# Patient Record
Sex: MALE | Race: OTHER | Employment: UNEMPLOYED | URBAN - METROPOLITAN AREA
[De-identification: names, ages, dates, MRNs, and addresses within clinical notes are randomized per-mention and may not be internally consistent; named-entity substitution may affect disease eponyms.]

---

## 2024-01-26 ENCOUNTER — APPOINTMENT (OUTPATIENT)
Dept: GENERAL RADIOLOGY | Facility: HOSPITAL | Age: 24
End: 2024-01-26
Payer: COMMERCIAL

## 2024-01-26 ENCOUNTER — APPOINTMENT (OUTPATIENT)
Dept: GENERAL RADIOLOGY | Facility: HOSPITAL | Age: 24
End: 2024-01-26
Attending: STUDENT IN AN ORGANIZED HEALTH CARE EDUCATION/TRAINING PROGRAM
Payer: COMMERCIAL

## 2024-01-26 ENCOUNTER — HOSPITAL ENCOUNTER (EMERGENCY)
Facility: HOSPITAL | Age: 24
Discharge: HOME | End: 2024-01-26
Attending: STUDENT IN AN ORGANIZED HEALTH CARE EDUCATION/TRAINING PROGRAM
Payer: COMMERCIAL

## 2024-01-26 VITALS
DIASTOLIC BLOOD PRESSURE: 59 MMHG | WEIGHT: 190 LBS | HEIGHT: 70 IN | TEMPERATURE: 98 F | RESPIRATION RATE: 25 BRPM | SYSTOLIC BLOOD PRESSURE: 116 MMHG | BODY MASS INDEX: 27.2 KG/M2 | OXYGEN SATURATION: 99 % | HEART RATE: 90 BPM

## 2024-01-26 DIAGNOSIS — S43.005A SHOULDER DISLOCATION, LEFT, INITIAL ENCOUNTER: Primary | ICD-10-CM

## 2024-01-26 PROCEDURE — 23650 CLTX SHO DSLC W/MNPJ WO ANES: CPT

## 2024-01-26 PROCEDURE — 73030 X-RAY EXAM OF SHOULDER: CPT

## 2024-01-26 PROCEDURE — 96374 THER/PROPH/DIAG INJ IV PUSH: CPT

## 2024-01-26 PROCEDURE — 73030 X-RAY EXAM OF SHOULDER: CPT | Performed by: STUDENT IN AN ORGANIZED HEALTH CARE EDUCATION/TRAINING PROGRAM

## 2024-01-26 PROCEDURE — 99285 EMERGENCY DEPT VISIT HI MDM: CPT

## 2024-01-26 RX ORDER — MIDAZOLAM HYDROCHLORIDE 1 MG/ML
2 INJECTION INTRAMUSCULAR; INTRAVENOUS ONCE
Status: DISCONTINUED | OUTPATIENT
Start: 2024-01-26 | End: 2024-01-26

## 2024-01-26 RX ORDER — KETAMINE HYDROCHLORIDE 50 MG/ML
INJECTION, SOLUTION INTRAMUSCULAR; INTRAVENOUS
Status: DISCONTINUED
Start: 2024-01-26 | End: 2024-01-27

## 2024-01-26 RX ORDER — KETAMINE HYDROCHLORIDE 50 MG/ML
1 INJECTION, SOLUTION INTRAMUSCULAR; INTRAVENOUS ONCE
Status: COMPLETED | OUTPATIENT
Start: 2024-01-26 | End: 2024-01-26

## 2024-01-27 NOTE — ED INITIAL ASSESSMENT (HPI)
Popped out right shoulder while playing basketball, states hx of right shoulder dislocations. States in the past he has been able to pop his shoulder back in but was unable to this time.

## 2024-01-27 NOTE — ED PROVIDER NOTES
Patient Seen in: [facility] Emergency Department      History     Chief Complaint   Patient presents with    Arm or Hand Injury     Stated Complaint: popped right shoulder out playing basketball, reports he has popped it out and *    Subjective:   HPI    Patient is a 24-year-old male presenting to emergency department with right shoulder dislocation.  He states that he has had problems with his right shoulder with it having popped out multiple times in the past.  He had played basketball quite competitively and in the past had been able to self reduce.  Tonight he was playing basketball as he is now playing recreationally and dislocation of heard with which he was not able to self reduce it prompting the visit to the ER.  He did report isolated right shoulder pain.  No weakness, no loss of sensation to the arm.    Objective:   History reviewed. No pertinent past medical history.           History reviewed. No pertinent surgical history.             Social History     Socioeconomic History    Marital status: Single   Tobacco Use    Smoking status: Never    Smokeless tobacco: Never   Vaping Use    Vaping Use: Never used   Substance and Sexual Activity    Alcohol use: Yes    Drug use: Never              Review of Systems    Positive for stated complaint: popped right shoulder out playing basketball, reports he has popped it out and *  Other systems are as noted in HPI.  Constitutional and vital signs reviewed.      All other systems reviewed and negative except as noted above.    Physical Exam     ED Triage Vitals   BP 01/26/24 1911 124/79   Pulse 01/26/24 1911 101   Resp 01/26/24 1911 20   Temp 01/26/24 1911 97.8 °F (36.6 °C)   Temp src 01/26/24 1911 Temporal   SpO2 01/26/24 1911 95 %   O2 Device 01/26/24 2030 None (Room air)       Current:/57   Pulse 90   Temp 97.8 °F (36.6 °C) (Temporal)   Resp 25   Ht 177.8 cm (5' 10\")   Wt 86.2 kg   SpO2 99%   BMI 27.26 kg/m²         Physical  Exam    Constitutional: No apparent distress  Eyes: No scleral icterus  Heart: regular rate rhythm, no murmurs  Lungs: Clear to auscultation bilaterally  Extremities: Right shoulder deformity appreciated concerning for dislocation.  Pain with any attempted movement noted by the patient.  Distal neurovascularly intact.  Neuro: Alert and oriented ×3          ED Course/ My interpretations:   Labs Reviewed - No data to display      My independent imaging interpretation:      Procedures    XR SHOULDER, COMPLETE (MIN 2 VIEWS), RIGHT (CPT=73030)    Anterior shoulder dislocation noted    XR SHOULDER, COMPLETE (MIN 2 VIEWS), RIGHT (CPT=73030)        All available radiology reports for this visit reviewed.      Medications given:  Orders Placed This Encounter    ketamine (Ketalar) 50 MG/ML injection 86 mg    DISCONTD: midazolam (Versed) 2 MG/2ML injection 2 mg    ketamine (Ketalar) 50 MG/ML injection                      MDM      Extensive differential was considered for the patient including shoulder dislocation, fracture, ligamentous injury, and other orthopedic and other pathology.    X-rays confirmed dislocation.    Procedure note:    Conscious sedation    The patient required sedation for closed reduction of right shoulder dislocation.  The risks, benefits, and alternatives were discussed with the patient and/or the family who consented.  The patient had a screening history and exam completed and there are no contraindications to sedation.  The patient has been NPO for 3 hours. ASA designation is ASA 1 - Normal health patient.  Mallampati score: I (soft palate, uvula, fauces, and tonsillar pillars visible).  The patient was placed on monitors and was under constant nursing observation.  Under my direct supervision the patient was given 86 mg of ketamine.  An appropriate level of sedation was achieved.  The patient remained hemodynamically stable with normal oxygen saturations during the procedure.  There were no  complications related to the sedation.  Patient was observed until mental status returned to baseline.  Patient was subsequently deemed appropriate for discharge home with responsible caretaker.  The sedation lasted 20 minutes during which I was present.    Closed reduction of right shoulder dislocation  Shoulder was reduced via traction countertraction technique.  Shoulder deformity resolved.    Postreduction x-rays were obtained.  Results discussed over the phone with Dr. Sunshine, orthopedics.  Given the fact the patient has resolution of his pain and has appropriate range of motion at this time he feels the patient is likely adequately reduced and advised that he should follow-up in the clinic for further evaluation.  Patient was advised of his x-ray results in detail including possible chip fracture. He advised on the importance of follow-up and reasons to seek emergency medical care if he were to worsen.  He demonstrated understanding, he is comfortable with the plan and follow-up as directed.             Disposition and Plan     Clinical Impression:  1. Shoulder dislocation, left, initial encounter         Disposition:  Discharge  1/26/2024 10:43 pm    Follow-up:  Dipesh Sunshine MD  [address] [place] [id number]  [phone]    Follow up            Medications Prescribed:  There are no discharge medications for this patient.                             Documentation created with the aid of Dragon voice recognition software.  Although efforts were made to ensure the accuracy of the note, some inaccuracies may persist.

## 2024-01-29 ENCOUNTER — TELEPHONE (OUTPATIENT)
Dept: ORTHOPEDICS CLINIC | Facility: CLINIC | Age: 24
End: 2024-01-29

## 2024-01-29 NOTE — TELEPHONE ENCOUNTER
Patient scheduled with Ana for right shoulder. Imaging in epic.    Future Appointments   Date Time Provider Department Center   1/30/2024  9:00 AM [id number]

## 2024-01-29 NOTE — TELEPHONE ENCOUNTER
New imaging?  Please advise.  Thank you!    DOI 1/26/24 shoulder dislocation w/ reduction    XR 1/26/24  Impression   CONCLUSION:  There is now posterior subluxation of the right glenohumeral joint with the humeral head appearing partially perched along the posterior glenoid.  Clinical correlation is recommended.  There may be a small chip fracture along the posterior  glenoid

## 2024-01-30 ENCOUNTER — HOSPITAL ENCOUNTER (OUTPATIENT)
Dept: MRI IMAGING | Facility: HOSPITAL | Age: 24
Discharge: HOME | End: 2024-01-30
Attending: PHYSICIAN ASSISTANT
Payer: COMMERCIAL

## 2024-01-30 ENCOUNTER — OFFICE VISIT (OUTPATIENT)
Dept: ORTHOPEDICS CLINIC | Facility: CLINIC | Age: 24
End: 2024-01-30
Payer: COMMERCIAL

## 2024-01-30 VITALS — HEIGHT: 70 IN | WEIGHT: 190 LBS | BODY MASS INDEX: 27.2 KG/M2

## 2024-01-30 DIAGNOSIS — S42.91XA TRAUMATIC CLOSED DISPLACED FRACTURE OF RIGHT SHOULDER WITH ANTERIOR DISLOCATION, INITIAL ENCOUNTER: ICD-10-CM

## 2024-01-30 DIAGNOSIS — M25.311 SHOULDER INSTABILITY, RIGHT: ICD-10-CM

## 2024-01-30 DIAGNOSIS — M25.311 SHOULDER INSTABILITY, RIGHT: Primary | ICD-10-CM

## 2024-01-30 PROCEDURE — 3008F BODY MASS INDEX DOCD: CPT | Performed by: PHYSICIAN ASSISTANT

## 2024-01-30 PROCEDURE — 73221 MRI JOINT UPR EXTREM W/O DYE: CPT | Performed by: PHYSICIAN ASSISTANT

## 2024-01-30 PROCEDURE — 99204 OFFICE O/P NEW MOD 45 MIN: CPT | Performed by: PHYSICIAN ASSISTANT

## 2024-01-30 NOTE — H&P
[facility] - ORTHOPEDICS  [address] [place] [id number]  [phone]     NEW PATIENT VISIT - HISTORY AND PHYSICAL EXAMINATION     Name: Donald Scott   MRN: [id number]  Date: 1/30/2024     CC: Right shoulder pain.    REFERRED BY: Fidencio Adams MD    HPI:   Donald Scott is a very pleasant 24 year old right-hand dominant male who presents today for evaluation, consultation, and management of right shoulder injury that occurred in January 20, 1723 after he was playing basketball and his shoulder popped out of place.  He reports that his shoulder is dislocated multiple times (unclear if subluxations) in the past and has always been able to self reduced.  However the most recent dislocation January 27, 2023 he was unable to reduce the shoulder.  He went to the emergency department at [facility] in which an x-ray confirmed a dislocation and the patient was recommended to undergo closed reduction of the right shoulder with traction that was successful.  He complains of weakness and rates his pain to be 1 out of 10.  He is a full-time student.    PMH:   History reviewed. No pertinent past medical history.    PAST SURGICAL HX:  History reviewed. No pertinent surgical history.    FAMILY HX:  History reviewed. No pertinent family history.    ALLERGIES:  Patient has no known allergies.    MEDICATIONS:   No current outpatient medications on file.       ROS: A comprehensive 14 point review of systems was performed and was negative aside from the aforementioned per history of present illness.    SOCIAL HX:  Social History     Occupational History    Not on file   Tobacco Use    Smoking status: Never    Smokeless tobacco: Never   Vaping Use    Vaping Use: Never used   Substance and Sexual Activity    Alcohol use: Yes    Drug use: Never    Sexual activity: Not on file        PE:   Vitals:    01/30/24 0915   Weight: 190 lb (86.2 kg)   Height: 5' 10\" (1.778 m)     Estimated body mass  index is 27.26 kg/m² as calculated from the following:    Height as of this encounter: 5' 10\" (1.778 m).    Weight as of this encounter: 190 lb (86.2 kg).    Physical Exam  Constitutional:       Appearance: Normal appearance.   HENT:      Head: Normocephalic and atraumatic.   Eyes:      Extraocular Movements: Extraocular movements intact.   Neck:      Musculoskeletal: Normal range of motion and neck supple.   Cardiovascular:      Pulses: Normal pulses.   Pulmonary:      Effort: Pulmonary effort is normal. No respiratory distress.   Abdominal:      General: There is no distension.   Skin:     General: Skin is warm.      Capillary Refill: Capillary refill takes less than 2 seconds.      Findings: No bruising.   Neurological:      General: No focal deficit present.      Mental Status: Alert.   Psychiatric:         Mood and Affect: Mood normal.     Examination of the right shoulder demonstrates:     Skin is intact, warm and dry.   Cervical:  Full ROM  Spurling's  Negative    Deformity:   none  Atrophy:   none    Scapular winging: Negative    Palpation:     AC Joint  Negative  Biceps Tendon  Positive  Greater Tuberosity Positive    ROM:   Forward Flexion:  120°  Abduction:   full and symmetric  External Rotation:  30°  Internal Rotation:  full and symmetric    Rotator Cuff Strength:   Supraspinatus:   5/5  Subscapularis:   5/5  Infraspinatus/Teres: 5/5    Provocative Tests:   [place]:   Positive  Speed's:   Positive  [place]'s:   Positive  Lift-off:   Negative  Apprehension:  Positive  Sulcus Sign:   Negative    Neurovascular Upper Extremity (Bilateral)  Motor:    5/5 EPL, Finger Abduction, , Pinch, Deltoid  Sensation:   intact to light touch median, ulnar, radial and axillary nerve  Circulation:   Normal, 2+ radial pulse    The contralateral upper extremity is without limitation in range of motion or strength, no positive provocative maneuvers.     Radiographic Examination/Diagnostics:    I personally viewed,  independently interpreted and radiology report was reviewed.      XR SHOULDER, COMPLETE (MIN 2 VIEWS), RIGHT (CPT=73030)    Result Date: 1/26/2024  PROCEDURE:  XR SHOULDER, COMPLETE (MIN 2 VIEWS), RIGHT (CPT=73030)  TECHNIQUE:  Multiple views were obtained.  COMPARISON:  [facility] , XR, XR SHOULDER, COMPLETE (MIN 2 VIEWS), RIGHT (CPT=73030), 1/26/2024, 7:50 PM.  INDICATIONS:  popped right shoulder out playing basketball, reports he has popped it out and back in before but couldn't pop it back in today.  Shoulder pain, dislocation  PATIENT STATED HISTORY: (As transcribed by Technologist)  popped right shoulder out playing basketball, reports he has popped it out and back in before but couldn't pop it back in today.    FINDINGS:  There is now posterior subluxation of the right glenohumeral joint with the humeral head appearing partially perched along the posterior glenoid.  Clinical correlation is recommended.  There may be a small chip fracture along the posterior glenoid.  Normal mineralization.            CONCLUSION:  There is now posterior subluxation of the right glenohumeral joint with the humeral head appearing partially perched along the posterior glenoid.  Clinical correlation is recommended.  There may be a small chip fracture along the posterior glenoid.    LOCATION:  [facility]   Dictated by (CST): Gomez Medina MD on 1/26/2024 at 9:01 PM     Finalized by (CST): Gomez Medina MD on 1/26/2024 at 9:02 PM       XR SHOULDER, COMPLETE (MIN 2 VIEWS), RIGHT (CPT=73030)    Result Date: 1/26/2024  PROCEDURE:  XR SHOULDER, COMPLETE (MIN 2 VIEWS), RIGHT (CPT=73030)  TECHNIQUE:  Multiple views were obtained.  COMPARISON:  None.  INDICATIONS:  popped right shoulder out playing basketball, reports he has popped it out and back in before but couldn't pop it back in today.  Dislocated shoulder with shoulder pain  PATIENT STATED HISTORY: (As transcribed by Technologist)  Patient states his right shoulder popped out of place  while playing basketball. Patient states he has a history of right shoulder dislocation but usually able to place back.    FINDINGS:  Anterior dislocation of the right glenohumeral joint noted.  No displaced fracture identified on the limited views.  Normal mineralization.  Unremarkable soft tissues.            CONCLUSION:  Anterior dislocation of the right glenohumeral joint.   LOCATION:  [facility]   Dictated by (CST): Gomez Medina MD on 1/26/2024 at 8:05 PM     Finalized by (CST): Gomez Medina MD on 1/26/2024 at 8:05 PM         IMPRESSION: Donald Scott is a 24 year old Right hand dominant male with right shoulder instability.     PLAN:   We had a detailed discussion outlining the etiology, anatomy, pathophysiology, and natural history of the patient's findings. Imaging was reviewed in detail and correlated to a 3-dimensional model of the patient's pathology.     We reviewed the treatment of this disease condition. In light of the acute traumatic incident, loss of normal function, and  failure to progress conservatively we recommend an MRI to evaluate the integrity of the patient's right shoulder labral complex. The patient will follow up after imaging.   Differential diagnosis includes but not limited to: rotator cuff/labral pathology, impingement, tendinopathy, cartilage injury/loose body, bone marrow edema, and osteoarthritis.     External records were also reviewed for pertinent historical findings contributing to the patients undiagnosed new problem with uncertain prognosis.     The patient had the opportunity to ask questions, and all questions were answered appropriately.         FOLLOW-UP:   Return to clinic following completion of MRI to review scan and findings.           JUNIOR Sky, PA-C Orthopedic Surgery / Sports Medicine Specialist  [facility]  [address] [place] [id number]   [email or web address]  [email or web address]  t: [phone]  o: [phone]  f:  [phone]    This note was dictated using Dragon software.  While it was briefly proofread prior to completion, some grammatical, spelling, and word choice errors due to dictation may still occur.

## 2024-02-02 ENCOUNTER — OFFICE VISIT (OUTPATIENT)
Dept: ORTHOPEDICS CLINIC | Facility: CLINIC | Age: 24
End: 2024-02-02
Payer: COMMERCIAL

## 2024-02-02 DIAGNOSIS — M21.821 HILL SACHS DEFORMITY, RIGHT: ICD-10-CM

## 2024-02-02 DIAGNOSIS — S43.431A TEAR OF RIGHT GLENOID LABRUM, INITIAL ENCOUNTER: ICD-10-CM

## 2024-02-02 DIAGNOSIS — S43.491A: Primary | ICD-10-CM

## 2024-02-02 PROCEDURE — 99215 OFFICE O/P EST HI 40 MIN: CPT | Performed by: ORTHOPAEDIC SURGERY

## 2024-02-02 NOTE — H&P
Orthopaedic Surgery  [address] [place] [id number]  [phone]     PRE SURGICAL - HISTORY AND PHYSICAL EXAMINATION     Name: Donald Scott   MRN: [id number]  Date: 2/2/2024     CC: Right shoulder pain and instability in the setting of glenoid labrum pathology.     HPI:   Donald Scott is a very pleasant 24 year old right-hand dominant male who presents today for evaluation of MRI scan of the shoulder and discussion regarding definitive management plan.     To summarize: right shoulder injury that occurred in January 20, 1723 after he was playing basketball and his shoulder popped out of place.  He reports that his shoulder is dislocated multiple times (unclear if subluxations) in the past and has always been able to self reduced.  However the most recent dislocation January 27, 2023 he was unable to reduce the shoulder.  He went to the emergency department at [facility] in which an x-ray confirmed a dislocation and the patient was recommended to undergo closed reduction of the right shoulder with traction that was successful.  He complains of weakness and rates his pain to be 1 out of 10.  He is a full-time student.     At the patient's last visit, evaluation was performed by my colleague Sincer LISANDRA Ching who recommended an MRI. The patient was then referred to me for consultation, and presents today for further discussion.       PMH:   No past medical history on file.    PAST SURGICAL HX:  No past surgical history on file.    FAMILY HX:  No family history on file.    ALLERGIES:  Patient has no known allergies.    MEDICATIONS:   No current outpatient medications on file.       ROS: A comprehensive 14 point review of systems was performed and was negative aside from the aforementioned per history of present illness.    SOCIAL HX:  Social History     Tobacco Use    Smoking status: Never    Smokeless tobacco: Never   Substance Use Topics    Alcohol use: Yes       PE:   There were no vitals filed for  this visit.  Estimated body mass index is 27.26 kg/m² as calculated from the following:    Height as of 1/30/24: 5' 10\" (1.778 m).    Weight as of 1/30/24: 190 lb (86.2 kg).    Physical Exam  Constitutional:       Appearance: Normal appearance.   HENT:      Head: Normocephalic and atraumatic.   Eyes:      Extraocular Movements: Extraocular movements intact.   Neck:      Musculoskeletal: Normal range of motion and neck supple.   Cardiovascular:      Pulses: Normal pulses.   Pulmonary:      Effort: Pulmonary effort is normal. No respiratory distress.   Abdominal:      General: There is no distension.   Skin:     General: Skin is warm.      Capillary Refill: Capillary refill takes less than 2 seconds.      Findings: No bruising.   Neurological:      General: No focal deficit present.      Mental Status: Alert.   Psychiatric:         Mood and Affect: Mood normal.     Examination of the right shoulder demonstrates:     Skin is intact, warm and dry.   Cervical:  Full ROM  Spurling's  Negative    Deformity:   none  Atrophy:   none    Scapular winging: Negative    Palpation:     AC Joint  Negative  Biceps Tendon  Negative  Greater Tuberosity Negative    ROM:   Forward Flexion:  full and symmetric  Abduction:   full and symmetric  External Rotation:  full and symmetric  Internal Rotation:  full and symmetric    Rotator Cuff Strength:   Supraspinatus:   5/5  Subscapularis:   5/5  Infraspinatus/Teres: 5/5    Provocative Tests:   Pacheco:   Negative  Speed's:   Negative  Scurry's:   Negative  Lift-off:    Negative  Apprehension:  Positive  Sulcus Sign:   Negative    Neurovascular Upper Extremity (Bilateral)  Motor:    5/5 EPL, Finger Abduction, , Pinch, Deltoid  Sensation:   intact to light touch median, ulnar, radial and axillary nerve  Circulation:   Normal, 2+ radial pulse    The contralateral upper extremity is without limitation in range of motion or strength, no positive provocative maneuvers.     Radiographic  Examination/Diagnostics:    XR and MRI of the shoulder personally viewed, independently interpreted and radiology report was reviewed.    MRI SHOULDER, RIGHT (CPT=73221)    Result Date: 1/30/2024  PROCEDURE:  MRI SHOULDER, RIGHT (CPT=73221)  COMPARISON:  [facility] , XR, XR SHOULDER, COMPLETE (MIN 2 VIEWS), RIGHT (CPT=73030), 1/26/2024, 7:50 PM.  [facility], XR, XR SHOULDER, COMPLETE (MIN 2 VIEWS), RIGHT (CPT=73030), 1/26/2024, 8:57 PM.  INDICATIONS:  Follow-up for recent traumatic right shoulder anterior glenohumeral dislocation.  Patient has shoulder pain and limited range of motion.  TECHNIQUE:  Multiplanar imaging of the shoulder including oblique coronal, axial and sagittal imaging was acquired including proton density fat suppression technique. Images were performed without intravenous gadolinium.  PATIENT STATED HISTORY: (As transcribed by Technologist)  Patient dislocated right shoulder Friday evening playing basketball.  Pain and LROM.    FINDINGS:  ROTATOR CUFF:  The rotator cuff is intact with a mild degree of tendinosis along the distal insertional fibers of supraspinatus and infraspinatus. MUSCULATURE:  No strain, edema, or atrophy.  AC JOINT/ACROMION:  No significant arthritis or acute injury.  Normal marrow and cortical signal. Type II acromion.  Normal subacromial space without evidence of subacromial impingement.  No evidence of subacromial/subdeltoid bursal fluid. BICEPS/LABRAL COMPLEX:  The long head of the biceps tendon is intact and within normal limits.  There is a slap lesion of the biceps labral anchor that extends into the posterior superior quadrant, the anterior superior and anterior inferior quadrant.  There is evidence of an associated anterior inferior quadrant labral-ligamentous periosteal sleeve avulsion (ALPSA lesion).  The posterior band of the IGHL appears attenuated at the humeral insertion, suspicious for sprain without makenzie tear or high-grade injury.  The middle glenohumeral ligament  is unremarkable. GLENOHUMERAL JOINT:  There is a moderate glenohumeral joint effusion.  There is a Hill-Sachs lesion of the posterior superior aspect of the humeral head with a slight degree of flattening and bone marrow edema.  There is also evidence of multiple subcortical cystic changes, suggesting that this Hill-Sachs lesion is likely acute on chronic.  Please correlate with patient's history for previous glenohumeral dislocations.  No osseous Bankart lesion is noted.            CONCLUSION:  1. Findings are suggestive of an acute on chronic glenohumeral dislocation with flattening and irregularity along the posterior superior humeral head with a slight degree of subcortical edema and subcortical cystic changes. 2. There is evidence of an ALPSA lesion along the anterior inferior quadrant.  No osseous Bankart lesion. 3. There is an associated slap lesion that extends from the biceps labral anchor into the posterior superior quadrant, the anterior superior quadrant and anterior inferior quadrant. 4. There is the suggestion of some attenuation of the humeral insertion of the posterior band of the IGHL, concerning for a mild sprain without discrete evidence of a high-grade injury or disruption. 5. Moderate glenohumeral joint effusion.   LOCATION:  [facility]   Dictated by (CST): Dav Del Real DO on 1/30/2024 at 3:10 PM     Finalized by ([facility]ST): Dav Del Real DO on 1/30/2024 at 3:18 PM       XR SHOULDER, COMPLETE (MIN 2 VIEWS), RIGHT (CPT=73030)    Result Date: 1/26/2024  PROCEDURE:  XR SHOULDER, COMPLETE (MIN 2 VIEWS), RIGHT (CPT=73030)  TECHNIQUE:  Multiple views were obtained.  COMPARISON:  [facility], XR SHOULDER, COMPLETE (MIN 2 VIEWS), RIGHT (CPT=73030), 1/26/2024, 7:50 PM.  INDICATIONS:  popped right shoulder out playing basketball, reports he has popped it out and back in before but couldn't pop it back in today.  Shoulder pain, dislocation  PATIENT STATED HISTORY: (As transcribed by Technologist)  popped right  shoulder out playing basketball, reports he has popped it out and back in before but couldn't pop it back in today.    FINDINGS:  There is now posterior subluxation of the right glenohumeral joint with the humeral head appearing partially perched along the posterior glenoid.  Clinical correlation is recommended.  There may be a small chip fracture along the posterior glenoid.  Normal mineralization.            CONCLUSION:  There is now posterior subluxation of the right glenohumeral joint with the humeral head appearing partially perched along the posterior glenoid.  Clinical correlation is recommended.  There may be a small chip fracture along the posterior glenoid.    LOCATION:  [facility]   Dictated by ([facility]): Gomez Medina MD on 1/26/2024 at 9:01 PM     Finalized by ([facility]): Gomez Medina MD on 1/26/2024 at 9:02 PM       XR SHOULDER, COMPLETE (MIN 2 VIEWS), RIGHT (CPT=73030)    Result Date: 1/26/2024  PROCEDURE:  XR SHOULDER, COMPLETE (MIN 2 VIEWS), RIGHT (CPT=73030)  TECHNIQUE:  Multiple views were obtained.  COMPARISON:  None.  INDICATIONS:  popped right shoulder out playing basketball, reports he has popped it out and back in before but couldn't pop it back in today.  Dislocated shoulder with shoulder pain  PATIENT STATED HISTORY: (As transcribed by Technologist)  Patient states his right shoulder popped out of place while playing basketball. Patient states he has a history of right shoulder dislocation but usually able to place back.    FINDINGS:  Anterior dislocation of the right glenohumeral joint noted.  No displaced fracture identified on the limited views.  Normal mineralization.  Unremarkable soft tissues.            CONCLUSION:  Anterior dislocation of the right glenohumeral joint.   LOCATION:  [facility]   Dictated by ([facility]): Gomez Medina MD on 1/26/2024 at 8:05 PM     Finalized by ([facility]): Gomez Medina MD on 1/26/2024 at 8:05 PM         IMPRESSION: Donald Scott is a 24 year old male with Right shoulder  anterior labral periosteal sleeve avulsion after glenohumeral joint dislocation.  He has a history of 8 prior glenohumeral dislocations with large Hill-Sachs lesion and concern for anterior glenoid bone loss.    The patient has failed an appropriate course of nonsurgical conservative management and is a candidate for arthroscopic shoulder capsulorraphy and stabilization.  This to be augmented with remplissage.    Notably he is interested in trying PT prior to surgery, this referral was provided.    PLAN:   We had a detailed discussion outlining the etiology, anatomy, pathophysiology, and natural history of glenoid labrum pathology of the shoulder. Imaging was reviewed in detail and correlated to a 3-dimensional model of the shoulder.     I had a lengthy discussion with Donald about the diagnosis and options, both surgical and nonsurgical. I have recommended that we proceed with arthroscopic shoulder stabilization and labral repair as we agree surgical intervention would likely offer the best opportunity for symptomatic relief and functional recovery. I used imaging studies and a 3-dimensional model to outline his pathology, as well as general surgical principles. We reviewed the risks associated with shoulder arthroscopy.   In particular we discussed risks that include, but are not limited to infection, blood loss, potential transient or permanent injury to nerves or blood vessels, joint stiffness, persistent pain, need for future operation, failure of healing, wound complications, failure of therapeutic intervention, risk of re-injury, fixation failure, deep vein thrombosis and pulmonary embolism. We discussed the proposed rehabilitation timeline as well as expected postoperative restrictions.     Most post-surgical patients after labral repair utilize a shoulder immobilizer/sling for approximately ~4 weeks.  Physical therapy is initiated immediately postsurgically with initial passive range of motion, followed by  active assisted range of motion, and ultimately active range of motion after the 6 to 8-week jake.  After the 3-month jake postsurgically the restrictions are lifted and continued strengthening is recommended.    Donald voiced a good understanding of treatment options, risks and benefits, postoperative instructions, rehabilitation timeline, and restrictions. He was given the opportunity to ask questions, which were all answered to the best of my ability and to his satisfaction. Donald will work with my office to arrange a time for surgery and obtain any medical clearance information necessary. My pre-operative information packet, which details the process and answers many FAQ's will be provided. He was encouraged to call the office with any further questions or concerns.    I spent 40 minutes in preparation to see the patient, counseling/education of relevant pathology, discussing imaging results, surgical counseling, and care coordination.      FOLLOW-UP:   Post-Operative Visit, POD 6 with Sincer LISANDRA Cihng MD  Knee, Shoulder, & Elbow Surgery / Sports Medicine Specialist  Orthopaedic Surgery  [address] [place] [address].org  [email or web address]  t: [phone]  o: [phone]  f: [phone]    This note was dictated using Dragon software.  While it was briefly proofread prior to completion, some grammatical, spelling, and word choice errors due to dictation may still occur.

## 2024-02-02 NOTE — PROGRESS NOTES
OR BOOKING SHEET SHOULDER  Location: [x] [facility]   [x] [facility]  Name: Donald Scott  MRN: [id number]   : 2000  Diagnos  [x] Anterior labral periosteal sleeve avulsion lesion of right shoulder, initial encounter [S43.909W]  Disposition:    [x] Ambulatory  [] Overnight for CHARITO  [] Overnight for observation and pain control  [] Inpatient procedure    Operative Time Required:  2 hours ([facility])   Antibiotics: 2 g cefazolin within 60 minutes of surgical incision  Procedure:   Laterality: [x] RIGHT [] LEFT                  [] BILATERAL  Procedures:   [x] Shoulder Arthroscopy    [] Rotator Cuff Repair (79036)  [] Arthroscopic Biceps Tenodesis (40864)  [] Subacromial Decompression (44481)  [] Distal Clavicle Excision (07088)    [x] SLAP repair (80742)  [x] Capsulorraphy (94754)    Additional info:   [] PCP Clearance Needed  [] MRSA  [] C-Arm  [x] TXA at time surgery  [x] Physical Therapy External - Melissa Munoz  [x] DME Rx Needed  [] Appt with Dr. Fair needed  Implants needed: Arthrex, [place]  Positioning Equipment: Beach Chair Setup, [place]